# Patient Record
Sex: FEMALE | Race: OTHER | RURAL
[De-identification: names, ages, dates, MRNs, and addresses within clinical notes are randomized per-mention and may not be internally consistent; named-entity substitution may affect disease eponyms.]

---

## 2019-10-15 ENCOUNTER — OFFICE VISIT (OUTPATIENT)
Dept: FAMILY MEDICINE CLINIC | Age: 39
End: 2019-10-15

## 2019-10-15 VITALS
SYSTOLIC BLOOD PRESSURE: 138 MMHG | BODY MASS INDEX: 34.16 KG/M2 | TEMPERATURE: 98 F | RESPIRATION RATE: 18 BRPM | OXYGEN SATURATION: 100 % | WEIGHT: 174 LBS | DIASTOLIC BLOOD PRESSURE: 92 MMHG | HEIGHT: 60 IN | HEART RATE: 77 BPM

## 2019-10-15 DIAGNOSIS — Z98.51 HX OF TUBAL LIGATION: ICD-10-CM

## 2019-10-15 DIAGNOSIS — I10 ESSENTIAL HYPERTENSION: Primary | ICD-10-CM

## 2019-10-15 DIAGNOSIS — E11.8 CONTROLLED TYPE 2 DIABETES MELLITUS WITH COMPLICATION, WITHOUT LONG-TERM CURRENT USE OF INSULIN (HCC): ICD-10-CM

## 2019-10-15 DIAGNOSIS — Z31.69 ENCOUNTER FOR PRECONCEPTION CONSULTATION: ICD-10-CM

## 2019-10-15 RX ORDER — METFORMIN HYDROCHLORIDE 500 MG/1
TABLET ORAL 2 TIMES DAILY WITH MEALS
COMMUNITY

## 2019-10-15 NOTE — PATIENT INSTRUCTIONS
Un estilo de antione cornelio: Instrucciones de cuidado - [ A Healthy Lifestyle: Care Instructions ]  Instrucciones de cuidado    Un estilo de antione saludable puede ayudarle a sentirse loki, mantener un peso saludable y [place] energía tanto para el trabajo yanira para la diversión. Un estilo de antione saludable es un comportamiento que puede compartir con toda gale daniele. Un estilo de antione saludable también puede disminuir el riesgo de tener serios problemas de [place], yanira presión arterial gerry, enfermedad del corazón y diabetes. Puede seguir algunos de los pasos que se mencionan a continuación para mejorar gale valery y la de gale daniele. La atención de seguimiento es nohemy parte clave de gale tratamiento y seguridad. Asegúrese de hacer y acudir a todas las citas, y llame a gale médico si está teniendo problemas. También es nohemy buena idea saber los resultados de stewart exámenes y mantener nohemy lista de los medicamentos que alon. ¿Cómo puede cuidarse en el hogar? · No consuma azúcar, grasas ni comidas rápidas en exceso. Puede seguir comiendo el postre y darse algunos gustos de vez en cuando. El objetivo es la moderación. · Para mejorar stewart hábitos alimenticios, comience poco a poco. Preste atención a los tamaños de las porciones, eleni menos jugo y soda, y coma más frutas y verduras. ? Coma alimentos en cantidades saludables. Por ejemplo, nohemy porción de carne de 3 onzas (85 gramos) es aproximadamente del tamaño de nohemy baraja de naipes. Complete el johnny de gale plato con verduras y granos integrales. ? Limite la cantidad de sodas y bebidas deportivas que diandra todos los días. Eleni más agua cuando tenga sed. ? Coma al menos 5 porciones de frutas y verduras todos los sami. Podría parecer mucho, tito alcanzar esta meta no es difícil. Nohemy porción es 1 fruta, 1 taza de verduras o 2 tazas de verduras de hojas crudas. Coma nohmey manzana o algunos bastones de zanahoria ynaira refrigerio kenia la tarde, en lugar de dulces.  Intente comer frutas y/o verduras en todas las comidas. · Incorpore la práctica de ejercicio yanira parte de gale rutina diaria. Es posible que desee empezar con actividades simples, yanira caminar, montar en bicicleta o nadar lentamente. Intente estar activo kenia 30 a 60 minutos todos los días. No necesita hacer los 30 a 60 minutos de actividad continuos. Por ejemplo, puede hacer 10 ó 20 minutos de ejercicio 3 veces al día. El ejercicio moderado es seguro para la mayoría de las personas, skyler siempre es nohemy buena idea hablar con gale médico antes de empezar un programa de ejercicio. · Manténgase en movimiento. Teagan el césped, cuide el jardín o limpie gale hogar. Suba por las escaleras en lugar de utilizar el elevador en [place]. · Si fuma, deje de hacerlo. Las personas que fuman corren un riesgo mayor de tener un ataque al corazón, ataque cerebral, cáncer y otras enfermedades pulmonares. Dejar de fumar es difícil, skyler existen distintas maneras que le permiten aumentar stewart probabilidades de abandonar el tabaco para siempre. ? Utilice chicles, parches o pastillas de nicotina para chupar. ? Pregúntele a gale médico acerca de los programas y medicamentos para dejar de fumar. ? Continúe intentando. Además de disminuir el riesgo de contraer enfermedades futuras, usted notará algunos beneficios poco después de abandonar el tabaco. Si le falta el aire o tiene síntomas de asma, ellos probablemente mejorarán dentro de unas pocas semanas después de abandonar el hábito. · Limite la cantidad de alcohol que diandra. Está loki consumir moderadas cantidades de alcohol (hasta 2 bebidas al día para hombres, 1 bebida al día para mujeres). Skyler beber en exceso puede provocar problemas en el hígado, presión arterial gerry y [de-identified] problemas de la valery. Valery familiar  Si tiene nohemy daniele, hay muchas cosas que pueden hacer juntos para [employer]. · Coma en daniele cada vez que le sea posible. · Consuma alimentos sanos.  Entre ellos se incluyen frutas, verduras, yousuf magras, productos lácteos y granos integrales. · Incluya a gale daniele en gale plan de acondicionamiento físico. La mayoría de las personas piensan en actividades yanira trotar o jugar al tenis yanira la [place] de lograr un buen estado físico, tito existen muchas formas en las que usted y gale daniele pueden estar más [place]. Cualquier actividad que le kathy respirar con Jillian Jacky y estimule el bombeo del corazón se considera ejercicio. Dawnatte Pennant son algunas sugerencias:  ? Camine para hacer diligencias o para llevar a gale hijo a la escuela o hasta el autobús. ? Colt Sour a andar en bicicleta con la daniele después de comer en lugar de stephanie televisión. ¿Dónde puede encontrar más información en inglés? Brijesh Cason a [email or web address]-adry.[email or web address] Gm Johnsonan [id number] en la búsqueda para aprender más acerca de \"Un estilo de antione cornelio: Instrucciones de cuidado - [ A Healthy Lifestyle: Care Instructions ]. \"  Revisado: 28 [place], 2019  Versión del contenido: 12.2  © 9380-9863 [employer]. Las instrucciones de cuidado fueron adaptadas bajo licencia por [employer] (which disclaims liability or warranty for this information). Si usted tiene [place] afección médica o sobre estas instrucciones, siempre pregunte a gale profesional de valery. Healthwise, Incorporated niega toda garantía o responsabilidad por gale uso de esta información.

## 2019-10-15 NOTE — PROGRESS NOTES
[facility]    Subjective:   Smooth Ren is a 35 y.o. female with history of T2DM, tubal ligation  CC: desires to have another child  History provided by patient    HPI:   #: [id number]    Pt states that she had a tubal ligation about 6 years ago. She states that she wants to have kids now. She has had 4 children and has been pregnant 5 times total with one miscarriage. She still has regular periods that occur every month. She would like tubal ligation reversal surgery. Pt states that her PCP is in [place], and he manages her diabetes and blood pressure. She used to be on blood pressure medications, but he took her off since her pressures improved. She denies CP, palpitations, leg swelling, SOB, and headaches. PFSH:     Current Outpatient Medications on File Prior to Visit   Medication Sig Dispense Refill    metFORMIN (GLUCOPHAGE) 500 mg tablet Take  by mouth two (2) times daily (with meals). No current facility-administered medications on file prior to visit. There is no problem list on file for this patient.       Social History     Socioeconomic History    Marital status: SINGLE     Spouse name: Not on file    Number of children: Not on file    Years of education: Not on file    Highest education level: Not on file   Occupational History    Not on file   Social Needs    Financial resource strain: Not on file    Food insecurity:     Worry: Not on file     Inability: Not on file    Transportation needs:     Medical: Not on file     Non-medical: Not on file   Tobacco Use    Smoking status: Never Smoker    Smokeless tobacco: Never Used   Substance and Sexual Activity    Alcohol use: Never     Frequency: Never    Drug use: Not on file    Sexual activity: Not on file   Lifestyle    Physical activity:     Days per week: Not on file     Minutes per session: Not on file    Stress: Not on file   Relationships    Social connections:     Talks on phone: Not on file     Gets together: Not on file     Attends Gnosticism service: Not on file     Active member of club or organization: Not on file     Attends meetings of clubs or organizations: Not on file     Relationship status: Not on file    Intimate partner violence:     Fear of current or ex partner: Not on file     Emotionally abused: Not on file     Physically abused: Not on file     Forced sexual activity: Not on file   Other Topics Concern    Not on file   Social History Narrative    Not on file       Review of Systems   Eyes: Negative for blurred vision and pain. Respiratory: Negative for shortness of breath. No NAILS   Cardiovascular: Negative for chest pain, palpitations and leg swelling. Gastrointestinal: Negative for abdominal pain, nausea and vomiting. Genitourinary:        No breast tenderness. Regular periods. No dysmenorrhea or menorrhagia. Neurological: Negative for dizziness and headaches. Psychiatric/Behavioral: Negative for depression. The patient is not nervous/anxious. Objective:     Visit Vitals  BP (!) 138/92 (BP 1 Location: Right arm, BP Patient Position: Sitting)   Pulse 77   Temp 98 °F (36.7 °C) (Oral)   Resp 18   Ht 5' (1.524 m)   Wt 174 lb (78.9 kg)   SpO2 100%   BMI 33.98 kg/m²          Physical Exam   Constitutional: She appears well-developed and well-nourished. No distress. Cardiovascular: Normal rate, regular rhythm, normal heart sounds and intact distal pulses. No murmur heard. Pulmonary/Chest: Effort normal and breath sounds normal. No respiratory distress. She has no wheezes. She has no rales. Abdominal: Soft. Bowel sounds are normal. She exhibits no distension. There is no tenderness. Musculoskeletal: She exhibits no edema or tenderness. Skin: She is not diaphoretic. Psychiatric: She has a normal mood and affect. Her behavior is normal.       Pertinent Labs/Studies: none      Assessment and orders:       ICD-10-CM ICD-9-CM    1.  Hx of tubal ligation Z98.51 V26.51 REFERRAL TO GYNECOLOGY      CANCELED: REFERRAL TO GYNECOLOGY     Encounter Diagnoses   Name Primary?  Hx of tubal ligation Yes     Diagnoses and all orders for this visit:    1. Hx of tubal ligation - pt would like this surgery reversed. Pt would [facility] to go to [facility], so will refer to [facility]. There are certain versions of this surgery that certainly can be reversed. Pt is still having regular menstrual cycles. Pt would have chronic hypertension and AMA as risk factors if pregnant again.  -     REFERRAL TO GYNECOLOGY    2. Elevated blood pressure reading - /92. Pt states that she has this managed by her PCP in [place]. Will defer to his judgement. Possible that she will need antihypertensive agent. States that she previously was, but was taken off of it. Follow-up and Dispositions    · Return if symptoms worsen or fail to improve. I have discussed the diagnosis with the patient and the intended plan as seen in the above orders. Social history, medical history, and labs were reviewed. The patient has received an after-visit summary and questions were answered concerning future plans. I have discussed medication side effects and warnings with the patient as well.     Freddie Chairez MD  Resident [facility]  10/16/19

## 2019-10-15 NOTE — PROGRESS NOTES
Reviewed record in preparation for visit and have obtained necessary documentation. Patient did not bring medications to visit for review. Information provided on Advanced Directive, Living Will. Body mass index is 33.98 kg/m².    Health Maintenance Due   Topic Date Due    DTaP/Tdap/Td series (1 - Tdap) 04/16/2000    PAP AKA CERVICAL CYTOLOGY  04/16/2000    Influenza Age 9 to Adult  08/01/2019

## 2019-10-29 NOTE — PROGRESS NOTES
I reviewed with the resident the medical history and the resident's findings on the physical examination. I discussed with the resident the patient's diagnosis and concur with the plan. Pt advised to go back to PCP for BP check and will put in referral to GYN for discussion of options given her h/o tubal ligation and desire to become pregnant now.